# Patient Record
Sex: FEMALE | Race: WHITE | NOT HISPANIC OR LATINO | URBAN - METROPOLITAN AREA
[De-identification: names, ages, dates, MRNs, and addresses within clinical notes are randomized per-mention and may not be internally consistent; named-entity substitution may affect disease eponyms.]

---

## 2020-07-24 ENCOUNTER — INPATIENT (INPATIENT)
Facility: HOSPITAL | Age: 35
LOS: 1 days | Discharge: HOME | End: 2020-07-26
Attending: OBSTETRICS & GYNECOLOGY | Admitting: OBSTETRICS & GYNECOLOGY
Payer: COMMERCIAL

## 2020-07-24 VITALS — HEIGHT: 60 IN | WEIGHT: 143.96 LBS

## 2020-07-24 LAB
BASOPHILS # BLD AUTO: 0.04 K/UL — SIGNIFICANT CHANGE UP (ref 0–0.2)
BASOPHILS NFR BLD AUTO: 0.5 % — SIGNIFICANT CHANGE UP (ref 0–2)
EOSINOPHIL # BLD AUTO: 0.12 K/UL — SIGNIFICANT CHANGE UP (ref 0–0.5)
EOSINOPHIL NFR BLD AUTO: 1.4 % — SIGNIFICANT CHANGE UP (ref 0–6)
HCT VFR BLD CALC: 40 % — SIGNIFICANT CHANGE UP (ref 34.5–45)
HGB BLD-MCNC: 13.7 G/DL — SIGNIFICANT CHANGE UP (ref 11.5–15.5)
IMM GRANULOCYTES NFR BLD AUTO: 0.6 % — SIGNIFICANT CHANGE UP (ref 0–1.5)
LYMPHOCYTES # BLD AUTO: 1.89 K/UL — SIGNIFICANT CHANGE UP (ref 1–3.3)
LYMPHOCYTES # BLD AUTO: 22.7 % — SIGNIFICANT CHANGE UP (ref 13–44)
MCHC RBC-ENTMCNC: 30 PG — SIGNIFICANT CHANGE UP (ref 27–34)
MCHC RBC-ENTMCNC: 34.3 GM/DL — SIGNIFICANT CHANGE UP (ref 32–36)
MCV RBC AUTO: 87.7 FL — SIGNIFICANT CHANGE UP (ref 80–100)
MONOCYTES # BLD AUTO: 0.61 K/UL — SIGNIFICANT CHANGE UP (ref 0–0.9)
MONOCYTES NFR BLD AUTO: 7.3 % — SIGNIFICANT CHANGE UP (ref 2–14)
NEUTROPHILS # BLD AUTO: 5.63 K/UL — SIGNIFICANT CHANGE UP (ref 1.8–7.4)
NEUTROPHILS NFR BLD AUTO: 67.5 % — SIGNIFICANT CHANGE UP (ref 43–77)
NRBC # BLD: 0 /100 WBCS — SIGNIFICANT CHANGE UP (ref 0–0)
PLATELET # BLD AUTO: 193 K/UL — SIGNIFICANT CHANGE UP (ref 150–400)
RBC # BLD: 4.56 M/UL — SIGNIFICANT CHANGE UP (ref 3.8–5.2)
RBC # FLD: 12.6 % — SIGNIFICANT CHANGE UP (ref 10.3–14.5)
RH IG SCN BLD-IMP: POSITIVE — SIGNIFICANT CHANGE UP
SARS-COV-2 IGG SERPL QL IA: NEGATIVE — SIGNIFICANT CHANGE UP
SARS-COV-2 IGM SERPL IA-ACNC: <0.1 INDEX — SIGNIFICANT CHANGE UP
T PALLIDUM AB TITR SER: NEGATIVE — SIGNIFICANT CHANGE UP
WBC # BLD: 8.34 K/UL — SIGNIFICANT CHANGE UP (ref 3.8–10.5)
WBC # FLD AUTO: 8.34 K/UL — SIGNIFICANT CHANGE UP (ref 3.8–10.5)

## 2020-07-24 RX ORDER — OXYTOCIN 10 UNIT/ML
1 VIAL (ML) INJECTION
Qty: 30 | Refills: 0 | Status: DISCONTINUED | OUTPATIENT
Start: 2020-07-24 | End: 2020-07-24

## 2020-07-24 RX ORDER — DIBUCAINE 1 %
1 OINTMENT (GRAM) RECTAL EVERY 6 HOURS
Refills: 0 | Status: DISCONTINUED | OUTPATIENT
Start: 2020-07-24 | End: 2020-07-26

## 2020-07-24 RX ORDER — AER TRAVELER 0.5 G/1
1 SOLUTION RECTAL; TOPICAL EVERY 4 HOURS
Refills: 0 | Status: DISCONTINUED | OUTPATIENT
Start: 2020-07-24 | End: 2020-07-26

## 2020-07-24 RX ORDER — BENZOCAINE 10 %
1 GEL (GRAM) MUCOUS MEMBRANE EVERY 6 HOURS
Refills: 0 | Status: DISCONTINUED | OUTPATIENT
Start: 2020-07-24 | End: 2020-07-26

## 2020-07-24 RX ORDER — IBUPROFEN 200 MG
600 TABLET ORAL EVERY 6 HOURS
Refills: 0 | Status: COMPLETED | OUTPATIENT
Start: 2020-07-24 | End: 2021-06-22

## 2020-07-24 RX ORDER — HYDROCORTISONE 1 %
1 OINTMENT (GRAM) TOPICAL EVERY 6 HOURS
Refills: 0 | Status: DISCONTINUED | OUTPATIENT
Start: 2020-07-24 | End: 2020-07-26

## 2020-07-24 RX ORDER — LANOLIN
1 OINTMENT (GRAM) TOPICAL EVERY 6 HOURS
Refills: 0 | Status: DISCONTINUED | OUTPATIENT
Start: 2020-07-24 | End: 2020-07-26

## 2020-07-24 RX ORDER — OXYCODONE HYDROCHLORIDE 5 MG/1
5 TABLET ORAL
Refills: 0 | Status: DISCONTINUED | OUTPATIENT
Start: 2020-07-24 | End: 2020-07-26

## 2020-07-24 RX ORDER — OXYTOCIN 10 UNIT/ML
333.33 VIAL (ML) INJECTION
Qty: 20 | Refills: 0 | Status: DISCONTINUED | OUTPATIENT
Start: 2020-07-24 | End: 2020-07-26

## 2020-07-24 RX ORDER — TETANUS TOXOID, REDUCED DIPHTHERIA TOXOID AND ACELLULAR PERTUSSIS VACCINE, ADSORBED 5; 2.5; 8; 8; 2.5 [IU]/.5ML; [IU]/.5ML; UG/.5ML; UG/.5ML; UG/.5ML
0.5 SUSPENSION INTRAMUSCULAR ONCE
Refills: 0 | Status: DISCONTINUED | OUTPATIENT
Start: 2020-07-24 | End: 2020-07-26

## 2020-07-24 RX ORDER — DIPHENHYDRAMINE HCL 50 MG
25 CAPSULE ORAL EVERY 6 HOURS
Refills: 0 | Status: DISCONTINUED | OUTPATIENT
Start: 2020-07-24 | End: 2020-07-26

## 2020-07-24 RX ORDER — PRAMOXINE HYDROCHLORIDE 150 MG/15G
1 AEROSOL, FOAM RECTAL EVERY 4 HOURS
Refills: 0 | Status: DISCONTINUED | OUTPATIENT
Start: 2020-07-24 | End: 2020-07-26

## 2020-07-24 RX ORDER — IBUPROFEN 200 MG
600 TABLET ORAL EVERY 6 HOURS
Refills: 0 | Status: DISCONTINUED | OUTPATIENT
Start: 2020-07-24 | End: 2020-07-26

## 2020-07-24 RX ORDER — MAGNESIUM HYDROXIDE 400 MG/1
30 TABLET, CHEWABLE ORAL
Refills: 0 | Status: DISCONTINUED | OUTPATIENT
Start: 2020-07-24 | End: 2020-07-26

## 2020-07-24 RX ORDER — SODIUM CHLORIDE 9 MG/ML
1000 INJECTION, SOLUTION INTRAVENOUS
Refills: 0 | Status: DISCONTINUED | OUTPATIENT
Start: 2020-07-24 | End: 2020-07-24

## 2020-07-24 RX ORDER — OXYCODONE HYDROCHLORIDE 5 MG/1
5 TABLET ORAL ONCE
Refills: 0 | Status: DISCONTINUED | OUTPATIENT
Start: 2020-07-24 | End: 2020-07-26

## 2020-07-24 RX ORDER — KETOROLAC TROMETHAMINE 30 MG/ML
30 SYRINGE (ML) INJECTION ONCE
Refills: 0 | Status: DISCONTINUED | OUTPATIENT
Start: 2020-07-24 | End: 2020-07-24

## 2020-07-24 RX ORDER — SIMETHICONE 80 MG/1
80 TABLET, CHEWABLE ORAL EVERY 4 HOURS
Refills: 0 | Status: DISCONTINUED | OUTPATIENT
Start: 2020-07-24 | End: 2020-07-26

## 2020-07-24 RX ORDER — CITRIC ACID/SODIUM CITRATE 300-500 MG
15 SOLUTION, ORAL ORAL EVERY 6 HOURS
Refills: 0 | Status: DISCONTINUED | OUTPATIENT
Start: 2020-07-24 | End: 2020-07-24

## 2020-07-24 RX ORDER — FENTANYL/BUPIVACAINE/NS/PF 2MCG/ML-.1
250 PLASTIC BAG, INJECTION (ML) INJECTION
Refills: 0 | Status: DISCONTINUED | OUTPATIENT
Start: 2020-07-24 | End: 2020-07-24

## 2020-07-24 RX ORDER — SODIUM CHLORIDE 9 MG/ML
3 INJECTION INTRAMUSCULAR; INTRAVENOUS; SUBCUTANEOUS EVERY 8 HOURS
Refills: 0 | Status: DISCONTINUED | OUTPATIENT
Start: 2020-07-24 | End: 2020-07-26

## 2020-07-24 RX ORDER — ACETAMINOPHEN 500 MG
975 TABLET ORAL EVERY 6 HOURS
Refills: 0 | Status: DISCONTINUED | OUTPATIENT
Start: 2020-07-24 | End: 2020-07-26

## 2020-07-24 RX ORDER — OXYTOCIN 10 UNIT/ML
333.33 VIAL (ML) INJECTION
Qty: 20 | Refills: 0 | Status: DISCONTINUED | OUTPATIENT
Start: 2020-07-24 | End: 2020-07-24

## 2020-07-24 RX ADMIN — Medication 30 MILLIGRAM(S): at 21:05

## 2020-07-24 RX ADMIN — Medication 1000 MILLIUNIT(S)/MIN: at 19:21

## 2020-07-24 RX ADMIN — Medication 1000 MILLIUNIT(S)/MIN: at 20:13

## 2020-07-24 RX ADMIN — Medication 1 MILLIUNIT(S)/MIN: at 07:49

## 2020-07-24 RX ADMIN — SODIUM CHLORIDE 125 MILLILITER(S): 9 INJECTION, SOLUTION INTRAVENOUS at 11:10

## 2020-07-24 RX ADMIN — Medication 975 MILLIGRAM(S): at 21:05

## 2020-07-24 RX ADMIN — SODIUM CHLORIDE 3 MILLILITER(S): 9 INJECTION INTRAMUSCULAR; INTRAVENOUS; SUBCUTANEOUS at 23:17

## 2020-07-24 RX ADMIN — SODIUM CHLORIDE 125 MILLILITER(S): 9 INJECTION, SOLUTION INTRAVENOUS at 07:35

## 2020-07-25 RX ORDER — ACETAMINOPHEN 500 MG
3 TABLET ORAL
Qty: 0 | Refills: 0 | DISCHARGE
Start: 2020-07-25

## 2020-07-25 RX ORDER — IBUPROFEN 200 MG
1 TABLET ORAL
Qty: 0 | Refills: 0 | DISCHARGE
Start: 2020-07-25

## 2020-07-25 RX ADMIN — Medication 600 MILLIGRAM(S): at 12:10

## 2020-07-25 RX ADMIN — Medication 600 MILLIGRAM(S): at 02:05

## 2020-07-25 RX ADMIN — Medication 975 MILLIGRAM(S): at 14:48

## 2020-07-25 RX ADMIN — Medication 1 APPLICATION(S): at 11:07

## 2020-07-25 RX ADMIN — Medication 975 MILLIGRAM(S): at 07:26

## 2020-07-25 RX ADMIN — Medication 975 MILLIGRAM(S): at 15:48

## 2020-07-25 RX ADMIN — Medication 1 SPRAY(S): at 11:08

## 2020-07-25 RX ADMIN — Medication 975 MILLIGRAM(S): at 06:56

## 2020-07-25 RX ADMIN — Medication 975 MILLIGRAM(S): at 21:47

## 2020-07-25 RX ADMIN — Medication 1 APPLICATION(S): at 02:09

## 2020-07-25 RX ADMIN — Medication 1 SPRAY(S): at 02:09

## 2020-07-25 RX ADMIN — Medication 600 MILLIGRAM(S): at 11:07

## 2020-07-25 RX ADMIN — Medication 600 MILLIGRAM(S): at 02:35

## 2020-07-25 RX ADMIN — SODIUM CHLORIDE 3 MILLILITER(S): 9 INJECTION INTRAMUSCULAR; INTRAVENOUS; SUBCUTANEOUS at 06:46

## 2020-07-25 RX ADMIN — Medication 975 MILLIGRAM(S): at 20:47

## 2020-07-25 RX ADMIN — Medication 600 MILLIGRAM(S): at 23:56

## 2020-07-25 RX ADMIN — Medication 1 TABLET(S): at 11:06

## 2020-07-25 RX ADMIN — Medication 600 MILLIGRAM(S): at 18:00

## 2020-07-25 NOTE — DISCHARGE NOTE OB - CARE PROVIDER_API CALL
Neva Allan)  Obstetrics and Gynecology  [address], [address]  [place] [address]  Phone: [phone]  Fax: [phone]  Follow Up Time:

## 2020-07-25 NOTE — PROGRESS NOTE ADULT - ASSESSMENT
Assessment/Plan    34y y.o. now PPD#1 s/p . AfVSS. Patient is progressing toward all postpartum milestones. Pain is well-controlled on PO medications. Anticipate discharge today or tomorrow.    1. Pain  - Well-controlled on Motrin and Tylenol ATC    2. GI  - Tolerating regular diet without n/v  - Colace PRN    3.   - D/c huston this AM  - TOV    4. DVT prophylaxis  - Encouraging ambulation    5. Dispo  - Anticipate dispo PPD#1 or 2        Dilcia Monterroso MD PGY1 Assessment/Plan    34y y.o. now PPD#1 s/p . AfVSS. Patient is progressing toward all postpartum milestones. Pain is well-controlled on PO medications. Anticipate discharge today or tomorrow.    1. Pain  - Well-controlled on Motrin and Tylenol ATC    2. GI  - Tolerating regular diet without n/v  - Colace PRN    3.   - D/c huston this AM with repeat TOV    4. DVT prophylaxis  - Encouraging ambulation    5. Dispo  - Anticipate dispo PPD#1 or 2        Dilcia Monterroso MD PGY1 Assessment/Plan    34y y.o. now PPD#1 s/p FAVD. AfVSS. Patient is progressing toward all postpartum milestones. Pain is well-controlled on PO medications. Anticipate discharge today or tomorrow.    1. Pain  - Well-controlled on Motrin and Tylenol ATC    2. GI  - Tolerating regular diet without n/v  - Colace PRN    3.   - D/c huston this AM with repeat TOV    4. DVT prophylaxis  - Encouraging ambulation    5. Dispo  - Anticipate dispo PPD#1 or 2        Dilcia Monterroso MD PGY1

## 2020-07-25 NOTE — DISCHARGE NOTE OB - HOSPITAL COURSE
Patient had uncomplicated, forceps assisted vaginal delivery.  Please see delivery note for details.  During postpartum course patient's vitals were stable, vaginal bleeding appropriate, and pain well controlled.  On day of discharge patient was ambulating, her pain controlled with oral medications, had adequate oral intake, and was voiding freely.  Discharge instructions and precautions were given.  Will return to clinic in 6 weeks for postpartum visit.

## 2020-07-25 NOTE — DISCHARGE NOTE OB - CARE PLAN
Principal Discharge DX:	Postpartum state  Goal:	safe discharge to home  Assessment and plan of treatment:	routine postpartum care

## 2020-07-25 NOTE — PROGRESS NOTE ADULT - SUBJECTIVE AND OBJECTIVE BOX
Patient is a 34y y.o. F now PPD #1 s/p .    NAEO. Patient was evaluated at bedside this AM. Pain is well-controlled with PO pain medications. She has not yet ambulated as her huston has not yet been removed. She endorses decreasing vaginal bleeding.    Vital Signs Last 24 Hrs  T(C): 36.5 (2020 05:54), Max: 37.8 (2020 20:30)  T(F): 97.7 (2020 05:54), Max: 100 (2020 20:30)  HR: 58 (2020 05:54) (58 - 84)  BP: 100/68 (2020 05:54) (100/68 - 129/83)  BP(mean): --  RR: 17 (2020 05:54) (16 - 18)  SpO2: 97% (2020 05:54) (96% - 100%)    Physical Exam  Gen: Well-appearing. No acute distress. Resting comfortably in bed.  Resp: Breathing comfortably on RA.  Abd: Soft, non-tender, non-distended. Uterus firm at umbilicus.  Extremities: No calf tenderness.  : Huston to gravity draining clear urine    Labs                          13.7   8.34  )-----------( 193      ( 2020 07:42 )             40.0                   20 @ 07:01  -  20 @ 06:50  --------------------------------------------------------  IN: 1000 mL / OUT: 2800 mL / NET: -1800 mL Patient is a 34y y.o. F now PPD #1 s/p .    NAEO. Patient was evaluated at bedside this AM. Pain is well-controlled with PO pain medications. Patient failed TOV yesterday, so has a huston this AM. Prior to huston replacement, patient was ambulating. She endorses decreasing vaginal bleeding.    Vital Signs Last 24 Hrs  T(C): 36.5 (2020 05:54), Max: 37.8 (2020 20:30)  T(F): 97.7 (2020 05:54), Max: 100 (2020 20:30)  HR: 58 (2020 05:54) (58 - 84)  BP: 100/68 (2020 05:54) (100/68 - 129/83)  BP(mean): --  RR: 17 (2020 05:54) (16 - 18)  SpO2: 97% (2020 05:54) (96% - 100%)    Physical Exam  Gen: Well-appearing. No acute distress. Resting comfortably in bed.  Resp: Breathing comfortably on RA.  Abd: Soft, non-tender, non-distended. Uterus firm at umbilicus.  Extremities: No calf tenderness.  : Huston to gravity draining clear urine    Labs                          13.7   8.34  )-----------( 193      ( 2020 07:42 )             40.0                   20 @ 07:01  -  20 @ 06:50  --------------------------------------------------------  IN: 1000 mL / OUT: 2800 mL / NET: -1800 mL Patient is a 34y y.o. F now PPD #1 s/p TIM JACKSON. Patient was evaluated at bedside this AM. Pain is well-controlled with PO pain medications. Patient failed TOV yesterday, so has a huston this AM. Prior to huston replacement, patient was ambulating. She endorses decreasing vaginal bleeding.    Vital Signs Last 24 Hrs  T(C): 36.5 (25 Jul 2020 05:54), Max: 37.8 (24 Jul 2020 20:30)  T(F): 97.7 (25 Jul 2020 05:54), Max: 100 (24 Jul 2020 20:30)  HR: 58 (25 Jul 2020 05:54) (58 - 84)  BP: 100/68 (25 Jul 2020 05:54) (100/68 - 129/83)  BP(mean): --  RR: 17 (25 Jul 2020 05:54) (16 - 18)  SpO2: 97% (25 Jul 2020 05:54) (96% - 100%)    Physical Exam  Gen: Well-appearing. No acute distress. Resting comfortably in bed.  Resp: Breathing comfortably on RA.  Abd: Soft, non-tender, non-distended. Uterus firm at umbilicus.  Extremities: No calf tenderness.  : Huston to gravity draining clear urine    Labs                          13.7   8.34  )-----------( 193      ( 24 Jul 2020 07:42 )             40.0                   07-24-20 @ 07:01  -  07-25-20 @ 06:50  --------------------------------------------------------  IN: 1000 mL / OUT: 2800 mL / NET: -1800 mL

## 2020-07-25 NOTE — DISCHARGE NOTE OB - PATIENT PORTAL LINK FT
You can access the FollowMyHealth Patient Portal offered by [facility] by registering at the following website: [email or web address]. By joining [facility]’s FollowMyHealth portal, you will also be able to view your health information using other applications (apps) compatible with our system.

## 2020-07-25 NOTE — LACTATION INITIAL EVALUATION - NS LACT CON REASON FOR REQ
multiparous mom/forceps. 25hr old  2.6% wt loss 2voids/4stools (breast only). mother reports she bf her first child (now 4yrs) for 9M.  observed at R breast in cradle hold with high attachment. mother reports nipples are sore and look like "lipstick". readjusted baby to a lower deep latch and mother reports increased comfort. reviewed bfing basics, positioning techniques, feeding/satiety cues, signs of good latch, and encouraged s2s contact.  advised responsive feeding 8-12x/day.

## 2020-07-25 NOTE — DISCHARGE NOTE OB - MEDICATION SUMMARY - MEDICATIONS TO TAKE
I will START or STAY ON the medications listed below when I get home from the hospital:    acetaminophen 325 mg oral tablet  -- 3 tab(s) by mouth every 6 hours  -- Indication: For Pain    ibuprofen 600 mg oral tablet  -- 1 tab(s) by mouth every 6 hours  -- Indication: For Pain    Prena1 oral capsule  -- 1 cap(s) by mouth once a day  -- Indication: For Postpartum

## 2020-07-25 NOTE — LACTATION INITIAL EVALUATION - BABY A SEX
[FreeTextEntry1] : Documented by Subhash Vázquez acting as a scribe for Dr. Louis Grigsby on 3/26/2019\par \par All medical record entries made by the Scribe were at my, Dr. Louis Grigsby's, direction and personally dictated by me on 3/26/2019. I have reviewed the chart and agree that the record accurately reflects my personal performance of the history, physical exam, assessment and plan. I have also personally directed, reviewed, and agree with the discharge instructions. \par \par \par \par \par  
Male

## 2020-07-26 VITALS
DIASTOLIC BLOOD PRESSURE: 68 MMHG | OXYGEN SATURATION: 95 % | TEMPERATURE: 98 F | HEART RATE: 66 BPM | SYSTOLIC BLOOD PRESSURE: 102 MMHG | RESPIRATION RATE: 18 BRPM

## 2020-07-26 PROCEDURE — 86901 BLOOD TYPING SEROLOGIC RH(D): CPT

## 2020-07-26 PROCEDURE — 36415 COLL VENOUS BLD VENIPUNCTURE: CPT

## 2020-07-26 PROCEDURE — 86850 RBC ANTIBODY SCREEN: CPT

## 2020-07-26 PROCEDURE — 86769 SARS-COV-2 COVID-19 ANTIBODY: CPT

## 2020-07-26 PROCEDURE — 86780 TREPONEMA PALLIDUM: CPT

## 2020-07-26 PROCEDURE — 85025 COMPLETE CBC W/AUTO DIFF WBC: CPT

## 2020-07-26 RX ADMIN — Medication 975 MILLIGRAM(S): at 10:43

## 2020-07-26 RX ADMIN — Medication 975 MILLIGRAM(S): at 03:15

## 2020-07-26 RX ADMIN — Medication 975 MILLIGRAM(S): at 03:45

## 2020-07-26 RX ADMIN — Medication 1 TABLET(S): at 11:08

## 2020-07-26 RX ADMIN — Medication 600 MILLIGRAM(S): at 06:00

## 2020-07-26 RX ADMIN — Medication 600 MILLIGRAM(S): at 00:27

## 2020-07-26 RX ADMIN — Medication 600 MILLIGRAM(S): at 11:51

## 2020-07-26 RX ADMIN — Medication 600 MILLIGRAM(S): at 11:08

## 2020-07-26 RX ADMIN — Medication 600 MILLIGRAM(S): at 06:30

## 2020-07-26 RX ADMIN — Medication 975 MILLIGRAM(S): at 09:38

## 2020-07-26 NOTE — PROGRESS NOTE ADULT - ASSESSMENT
A/P yo 34y  s/p FAVD, PP#2 , stable, meeting postpartum milestones  1. Pain: controlled on tylenol and motrin  2. GI: Tolerating regular diet  3. :  Voiding spontaneously without pain or difficulty  4. DVT prophylaxis: ambulating well, no SCDs needed at this time   5. Dispo: PP#2 unless otherwise specified

## 2020-07-26 NOTE — PROGRESS NOTE ADULT - SUBJECTIVE AND OBJECTIVE BOX
Patient evaluated at bedside.  No acute events overnight.    She reports pain is well controlled with medications.     She has been ambulating without assistance and is voiding spontaneously. Reports decrease in vaginal bleeding and denies clots.     She denies HA, dizziness, chest pain, palpitations, shortness of breath, n/v, heavy vaginal bleeding or perineal discomfort.    Physical Exam:  Vital Signs Last 24 Hrs  T(C): 36.7 (26 Jul 2020 06:00), Max: 36.8 (25 Jul 2020 22:00)  T(F): 98.1 (26 Jul 2020 06:00), Max: 98.2 (25 Jul 2020 22:00)  HR: 66 (26 Jul 2020 06:00) (62 - 77)  BP: 102/68 (26 Jul 2020 06:00) (102/68 - 125/85)  BP(mean): 79 (26 Jul 2020 06:00) (79 - 88)  RR: 18 (26 Jul 2020 06:00) (16 - 18)  SpO2: 95% (26 Jul 2020 06:00) (95% - 97%)    GA: NAD, A+0 x 3  Abd: + BS, soft, appropriately tender, nondistended, no rebound or guarding, uterus firm at midline  : lochia WNL  Extremities: no edema or calf tenderness                          13.7   8.34  )-----------( 193      ( 24 Jul 2020 07:42 )             40.0

## 2020-07-29 DIAGNOSIS — Z3A.40 40 WEEKS GESTATION OF PREGNANCY: ICD-10-CM

## 2020-07-29 DIAGNOSIS — O48.0 POST-TERM PREGNANCY: ICD-10-CM

## 2020-07-29 DIAGNOSIS — Z34.83 ENCOUNTER FOR SUPERVISION OF OTHER NORMAL PREGNANCY, THIRD TRIMESTER: ICD-10-CM
